# Patient Record
Sex: MALE
[De-identification: names, ages, dates, MRNs, and addresses within clinical notes are randomized per-mention and may not be internally consistent; named-entity substitution may affect disease eponyms.]

---

## 2017-04-03 ENCOUNTER — HOSPITAL ENCOUNTER (EMERGENCY)
Dept: HOSPITAL 31 - C.ER | Age: 42
Discharge: HOME | End: 2017-04-03
Payer: COMMERCIAL

## 2017-04-03 VITALS
TEMPERATURE: 98.2 F | DIASTOLIC BLOOD PRESSURE: 72 MMHG | SYSTOLIC BLOOD PRESSURE: 124 MMHG | HEART RATE: 65 BPM | RESPIRATION RATE: 18 BRPM

## 2017-04-03 VITALS — OXYGEN SATURATION: 99 %

## 2017-04-03 DIAGNOSIS — M54.5: Primary | ICD-10-CM

## 2017-04-03 LAB
BILIRUB UR-MCNC: NEGATIVE MG/DL
GLUCOSE UR STRIP-MCNC: NORMAL MG/DL
KETONES UR STRIP-MCNC: NEGATIVE MG/DL
LEUKOCYTE ESTERASE UR-ACNC: (no result) LEU/UL
PH UR STRIP: 7 [PH] (ref 5–8)
PROT UR STRIP-MCNC: NEGATIVE MG/DL
RBC # UR STRIP: NEGATIVE /UL
SP GR UR STRIP: 1.01 (ref 1–1.03)
UROBILINOGEN UR-MCNC: NORMAL MG/DL (ref 0.2–1)
WBC #/AREA URNS HPF: < 1 /HPF (ref 0–5)

## 2017-04-03 NOTE — C.PDOC
History Of Present Illness


42 yr old male presents to the ER with complaints of low back pain for the past 

1 week. Patient states the pain has progressively gotten worse and describes it 

as sharp and worse with movement. Patient states the pain radiates down to the 

groin area and bilateral thighs. Patient denies direct trauma, injury, nausea, 

vomiting, abdominal pain, diarrhea, dysuria, hematuria, weakness or numbness. 


Time Seen by Provider: 04/03/17 10:30


Chief Complaint (Nursing): Back Pain


History Per: Patient


History/Exam Limitations: no limitations


Onset/Duration Of Symptoms: Persistent (1 week)


Current Symptoms Are (Timing): Still Present


Quality Of Discomfort: Sharp





Past Medical History


Reviewed: Historical Data, Nursing Documentation, Vital Signs


Vital Signs: 


 Last Vital Signs











Temp  97.9 F   04/03/17 11:17


 


Pulse  56 L  04/03/17 11:17


 


Resp  20   04/03/17 11:17


 


BP  131/82   04/03/17 11:17


 


Pulse Ox  99   04/03/17 11:57











Family History: States: No Known Family Hx





- Social History


Hx Alcohol Use: No


Hx Substance Use: No





- Immunization History


Hx Tetanus Toxoid Vaccination: No


Hx Influenza Vaccination: No


Hx Pneumococcal Vaccination: No





Review Of Systems


Except As Marked, All Systems Reviewed And Found Negative.


Gastrointestinal: Negative for: Nausea, Vomiting, Abdominal Pain, Diarrhea


Genitourinary: Negative for: Dysuria, Hematuria


Musculoskeletal: Positive for: Back Pain (Low back pain, radiating to the groin 

area. )


Neurological: Negative for: Weakness, Numbness





Physical Exam





- Physical Exam


Appears: Well, Non-toxic, No Acute Distress


Skin: Normal Color, Warm, Dry


Head: Atraumatic, Normacephalic


Neck: Normal, Normal ROM, No Midline Cervical Tenderness, Supple


Chest: Symmetrical, No Tenderness


Cardiovascular: Rhythm Regular, No Murmur


Gastrointestinal/Abdominal: Normal Exam, Soft, No Tenderness, No Guarding, No 

Rebound


Back: Straight Leg Raising (Bilateral legs), Other


Extremity: Normal ROM, No Swelling


Neurological/Psych: Oriented x3, Normal Speech, Normal Motor





ED Course And Treatment


O2 Sat by Pulse Oximetry: 99





Medical Decision Making


Medical Decision Making: 


PLAN:


* Urinalysis 


* Motrin PO


* Tramadol PO 


* Valium PO 





Disposition


Counseled Patient/Family Regarding: Diagnosis, Need For Followup, Rx Given





- Disposition


Referrals: 


[facility] at [facility] [Outside]


Disposition: HOME/ ROUTINE


Disposition Time: 12:37


Condition: STABLE


Prescriptions: 


Ibuprofen [Motrin] 600 mg PO TID #15 tab


traMADol/Acetaminophen [Ultracet 37.5/325 mg] 1 tab PO TID PRN #20 tab


 PRN Reason: pain


diaZEpam [Valium] 5 mg PO TID #12 tab


Instructions:  Lumbar Radiculopathy (ED)


Forms:  Gen Discharge Inst Turkish, Work Excuse





- POA


Present On Arrival: None





- Clinical Impression


Clinical Impression: 


 Low back pain





- Scribe Statement


The provider has reviewed the documentation as recorded by the [facility]en[facility] Echeverria


Provider Attestation: 


All medical record entries made by the [facility] were at my direction and 

personally dictated by me. I have reviewed the chart and agree that the record 

accurately reflects my personal performance of the history, physical exam, 

medical decision making, and the department course for this patient. I have 

also personally directed, reviewed, and agree with the discharge instructions 

and disposition.

## 2018-10-06 ENCOUNTER — HOSPITAL ENCOUNTER (EMERGENCY)
Dept: HOSPITAL 14 - H.ER | Age: 43
LOS: 1 days | Discharge: HOME | End: 2018-10-07
Payer: SELF-PAY

## 2018-10-06 VITALS — RESPIRATION RATE: 16 BRPM

## 2018-10-06 DIAGNOSIS — R31.9: Primary | ICD-10-CM

## 2018-10-06 DIAGNOSIS — M54.9: ICD-10-CM

## 2018-10-06 DIAGNOSIS — N20.0: ICD-10-CM

## 2018-10-06 LAB
ALBUMIN SERPL-MCNC: 4.3 G/DL (ref 3.5–5)
ALBUMIN/GLOB SERPL: 1.2 {RATIO} (ref 1–2.1)
ALT SERPL-CCNC: 59 U/L (ref 21–72)
AST SERPL-CCNC: 42 U/L (ref 17–59)
BASOPHILS # BLD AUTO: 0 K/UL (ref 0–0.2)
BASOPHILS NFR BLD: 0.6 % (ref 0–2)
BILIRUB UR-MCNC: NEGATIVE MG/DL
BUN SERPL-MCNC: 17 MG/DL (ref 9–20)
CALCIUM SERPL-MCNC: 9.5 MG/DL (ref 8.4–10.2)
COLOR UR: (no result)
EOSINOPHIL # BLD AUTO: 0.1 K/UL (ref 0–0.7)
EOSINOPHIL NFR BLD: 2.2 % (ref 0–4)
ERYTHROCYTE [DISTWIDTH] IN BLOOD BY AUTOMATED COUNT: 14.7 % (ref 11.5–14.5)
GFR NON-AFRICAN AMERICAN: > 60
GLUCOSE UR STRIP-MCNC: >=500 MG/DL
HGB BLD-MCNC: 14.4 G/DL (ref 12–18)
LEUKOCYTE ESTERASE UR-ACNC: (no result) LEU/UL
LYMPHOCYTES # BLD AUTO: 2.3 K/UL (ref 1–4.3)
LYMPHOCYTES NFR BLD AUTO: 36.6 % (ref 20–40)
MCH RBC QN AUTO: 27.6 PG (ref 27–31)
MCHC RBC AUTO-ENTMCNC: 33.6 G/DL (ref 33–37)
MCV RBC AUTO: 82.1 FL (ref 80–94)
MONOCYTES # BLD: 0.4 K/UL (ref 0–0.8)
MONOCYTES NFR BLD: 5.9 % (ref 0–10)
NEUTROPHILS # BLD: 3.4 K/UL (ref 1.8–7)
NEUTROPHILS NFR BLD AUTO: 54.7 % (ref 50–75)
NRBC BLD AUTO-RTO: 0.1 % (ref 0–0)
PH UR STRIP: 6 [PH] (ref 5–8)
PLATELET # BLD: 205 K/UL (ref 130–400)
PMV BLD AUTO: 8.9 FL (ref 7.2–11.7)
PROT UR STRIP-MCNC: NEGATIVE MG/DL
RBC # BLD AUTO: 5.22 MIL/UL (ref 4.4–5.9)
RBC # UR STRIP: NEGATIVE /UL
SP GR UR STRIP: 1.02 (ref 1–1.03)
URINE CLARITY: CLEAR
UROBILINOGEN UR-MCNC: (no result) MG/DL (ref 0.2–1)
WBC # BLD AUTO: 6.3 K/UL (ref 4.8–10.8)

## 2018-10-06 PROCEDURE — 74176 CT ABD & PELVIS W/O CONTRAST: CPT

## 2018-10-06 PROCEDURE — 87181 SC STD AGAR DILUTION PER AGT: CPT

## 2018-10-06 PROCEDURE — 96374 THER/PROPH/DIAG INJ IV PUSH: CPT

## 2018-10-06 PROCEDURE — 80053 COMPREHEN METABOLIC PANEL: CPT

## 2018-10-06 PROCEDURE — 87086 URINE CULTURE/COLONY COUNT: CPT

## 2018-10-06 PROCEDURE — 85025 COMPLETE CBC W/AUTO DIFF WBC: CPT

## 2018-10-06 PROCEDURE — 81003 URINALYSIS AUTO W/O SCOPE: CPT

## 2018-10-06 PROCEDURE — 99283 EMERGENCY DEPT VISIT LOW MDM: CPT

## 2018-10-06 PROCEDURE — 96361 HYDRATE IV INFUSION ADD-ON: CPT

## 2018-10-06 NOTE — ED PDOC
HPI: Male  Pain


Chief Complaint (Provider): dysuria/hematuria/back pain


History Per: Patient (44 y/o male h/o renal calculi here with dysuria/hematuria 

that began today now associated with back pain.  Patient states he notes 

moderate testicular pain as well.  Notes he has had "cracks" by urethra x 1 

week.  Denies any fevers/chills/vomiting.)





<Jurgen Santizo - Last Filed: 10/07/18 00:08>





<Negro Li - Last Filed: 10/08/18 04:43>


Time Seen by Provider: 10/06/18 22:24


Chief Complaint (Nursing): Male Genitourinary





Past Medical History


Reviewed: Historical Data, Nursing Documentation, Vital Signs


Vital Signs: 





                                Last Vital Signs











Temp  98.6 F   10/06/18 22:01


 


Pulse  81   10/06/18 22:01


 


Resp  16   10/06/18 22:01


 


BP  153/103 H  10/06/18 22:01


 


Pulse Ox  98   10/06/18 22:01














- Family History


Family History: States: No Known Family Hx





- Immunization History


Hx Tetanus Toxoid Vaccination: No


Hx Influenza Vaccination: No


Hx Pneumococcal Vaccination: No





<Jurgen Santizo - Last Filed: 10/07/18 00:08>


Vital Signs: 





                                Last Vital Signs











Temp  98.4 F   10/07/18 00:30


 


Pulse  76   10/07/18 00:30


 


Resp  16   10/07/18 00:30


 


BP  127/90   10/07/18 00:30


 


Pulse Ox  97   10/07/18 00:30














<Negro Li - Last Filed: 10/08/18 04:43>





- Home Medications


Home Medications: 


                                Ambulatory Orders











 Medication  Instructions  Recorded


 


Ibuprofen [Motrin] 600 mg PO TID #15 tab 04/03/17


 


RX: traMADol/Acetaminophen 1 tab PO TID PRN #20 tab 04/03/17





[Ultracet 37.5/325 mg]  


 


diaZEpam [Valium] 5 mg PO TID #12 tab 04/03/17


 


Ciprofloxacin HCl [Cipro] 500 mg PO BID #14 tablet 10/07/18


 


Ibuprofen [Motrin] 600 mg PO Q8 PRN #21 tab 10/07/18














- Allergies


Allergies/Adverse Reactions: 


                                    Allergies











Allergy/AdvReac Type Severity Reaction Status Date / Time


 


No Known Allergies Allergy   Verified 10/06/18 22:01














Review of Systems


ROS Statement: Except As Marked, All Systems Reviewed And Found Negative


Genitourinary Male: Positive for: Dysuria, Scrotal Pain





<Jurgen Santizo - Last Filed: 10/07/18 00:08>





Physical Exam





- Reviewed


Nursing Documentation Reviewed: Yes


Vital Signs Reviewed: Yes





- Physical Exam


Appears: Positive for: Well, Non-toxic, No Acute Distress


Head Exam: Positive for: ATRAUMATIC, NORMAL INSPECTION, NORMOCEPHALIC


Skin: Positive for: Normal Color, Warm, DRY


Eye Exam: Positive for: EOMI, Normal appearance, PERRL


ENT: Positive for: Normal ENT Inspection


Neck: Positive for: Normal, Painless ROM


Cardiovascular/Chest: Positive for: Regular Rate, Rhythm


Respiratory: Positive for: CNT, Normal Breath Sounds


Gastrointestinal/Abdominal: Positive for: Normal Exam, Soft


Male Genital Exam: Positive for: normal genitalia (small abrasion noted distal 

tip of urethra).  Negative for: scrotum tenderness (R), scrotum tenderness (L), 

testicular tenderness (R), testicular tenderness (L)


Back: Positive for: Normal Inspection, R CVA Tenderness


Extremity: Positive for: Normal ROM


Neurologic/Psych: Positive for: Alert, Oriented





<Jurgen Santizo - Last Filed: 10/07/18 00:08>





- Laboratory Results


Result Diagrams: 


                                 10/06/18 22:45





                                 10/06/18 22:45





- ECG


O2 Sat by Pulse Oximetry: 98





- Progress


ED Course And Treament: 





toradol 15 mg iv x 1 dose





ns 1 liter 500 ml per hour





Patient notes improvement but describes pain persistent right parathoracic 

region





CT of abd/pelvis: no evidence of hydronephrosis. nonobstructing left renal 

nephrolithiasis.  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 


no obstructive or inflammatory bowel changes.











<Jurgen Santizo - Last Filed: 10/07/18 00:08>





- Laboratory Results


Result Diagrams: 


                                 10/06/18 22:45





                                 10/06/18 22:45





<Negro Li - Last Filed: 10/08/18 04:43>





Disposition





- Patient ED Disposition


Is Patient to be Admitted: No





- Disposition


Disposition: Routine/Home


Disposition Time: 00:06





<Jurgen Santizo - Last Filed: 10/07/18 00:08>





<Negro Li - Last Filed: 10/08/18 04:43>





- Clinical Impression


Clinical Impression: 


 Back pain, Hematuria








- Disposition


Referrals: 


Sherwin Jon MD [Medical Doctor] - 


Condition: FAIR


Prescriptions: 


Ciprofloxacin HCl [Cipro] 500 mg PO BID #14 tablet


Ibuprofen [Motrin] 600 mg PO Q8 PRN #21 tab


 PRN Reason: Pain, Moderate (4-7)


Instructions:  Blood in the Urine (Hematuria) in Adults, Upper Back Pain (DC)


Print Language: Jordanian





- PA / NP / Resident Statement


MD/DO has reviewed & agrees with the documentation as recorded.





<Negro Li - Last Filed: 10/08/18 04:43>

## 2018-10-07 VITALS
TEMPERATURE: 98.4 F | DIASTOLIC BLOOD PRESSURE: 90 MMHG | HEART RATE: 76 BPM | OXYGEN SATURATION: 97 % | SYSTOLIC BLOOD PRESSURE: 127 MMHG

## 2018-10-07 NOTE — CT
Date of service: 



10/06/2018



PROCEDURE:  CT Abdomen and Pelvis without intravenous contrast



HISTORY:

r/o renal colic



COMPARISON:

05/04/2011



TECHNIQUE:

CT scan of the abdomen and pelvis was performed without the use of 

intravenous contrast.  Multiple axial images were performed.  

Sagittal and coronal reformatted images were obtained.. 



Contrast dose: No contrast.



Radiation dose:



Total exam DLP = 644 mGy-cm.



This CT exam was performed using one or more of the following dose 

reduction techniques: Automated exposure control, adjustment of the 

mA and/or kV according to patient size, and/or use of iterative 

reconstruction technique.



FINDINGS:



LOWER THORAX:

Minimal atelectasis is noted.  No focal infiltrate is seen.  No 

effusion is noted.  Visualized distal esophagus shows evidence of 

small hiatal hernia.  Visualized stomach is normal in outline without 

wall thickening.  Duodenum is unremarkable. 



LIVER:

Liver may be fatty infiltrated, without evidence of focal mass or 

intrahepatic ductal dilatation. 



GALLBLADDER AND BILE DUCTS:

Unremarkable. 



PANCREAS:

Unremarkable. No gross lesion or ductal dilatation.



SPLEEN:

Unremarkable. 



ADRENALS:

Unremarkable. No mass. 



KIDNEYS AND URETERS:

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 

the prior CT scan.  A few very tiny left renal calculi are noted 

without caliceal dilatation.  No perinephric changes are seen.  There 

is a suggestion of a possible small low-density medial right mid to 

lower pole renal cyst although incompletely evaluated on this 

examination.  Ureters are normal in outline without dilatation or 

calculus. 



VASCULATURE:

Unremarkable. No aortic aneurysm. 



BOWEL:

Unremarkable. No obstruction. No gross mural thickening. No small 

bowel dilatation to suggest small bowel obstruction.  Terminal ileum 

is unremarkable.



APPENDIX:

Unremarkable. Normal appendix. 



PERITONEUM:

Unremarkable. No free fluid. No free air. 



LYMPH NODES:

Unremarkable. No enlarged lymph nodes. 



BLADDER:

Unremarkable. 



REPRODUCTIVE:

Prostate gland is top-normal in size. 



BONES:

No acute fracture. 



OTHER FINDINGS:

None.



IMPRESSION:

No evidence of obstructive uropathy.  Tiny nonobstructing left renal 

calculi.  No perinephric changes are hydronephrosis.  Normal outline 

of the bladder.



No appreciable acute inflammatory process elsewhere in the abdomen or 

pelvis.  This agrees with preliminary report provided by the on-call 

radiologist.